# Patient Record
Sex: MALE | Race: WHITE | NOT HISPANIC OR LATINO | Employment: FULL TIME | URBAN - METROPOLITAN AREA
[De-identification: names, ages, dates, MRNs, and addresses within clinical notes are randomized per-mention and may not be internally consistent; named-entity substitution may affect disease eponyms.]

---

## 2017-01-19 ENCOUNTER — OFFICE VISIT (OUTPATIENT)
Dept: PODIATRY | Facility: CLINIC | Age: 67
End: 2017-01-19
Payer: COMMERCIAL

## 2017-01-19 VITALS
SYSTOLIC BLOOD PRESSURE: 140 MMHG | WEIGHT: 281.31 LBS | BODY MASS INDEX: 38.15 KG/M2 | HEART RATE: 61 BPM | DIASTOLIC BLOOD PRESSURE: 76 MMHG

## 2017-01-19 DIAGNOSIS — B35.3 TINEA PEDIS OF BOTH FEET: Primary | ICD-10-CM

## 2017-01-19 DIAGNOSIS — B35.1 ONYCHOMYCOSIS DUE TO DERMATOPHYTE: ICD-10-CM

## 2017-01-19 PROCEDURE — 3078F DIAST BP <80 MM HG: CPT | Mod: S$GLB,,, | Performed by: PODIATRIST

## 2017-01-19 PROCEDURE — 99999 PR PBB SHADOW E&M-EST. PATIENT-LVL III: CPT | Mod: PBBFAC,,, | Performed by: PODIATRIST

## 2017-01-19 PROCEDURE — 3077F SYST BP >= 140 MM HG: CPT | Mod: S$GLB,,, | Performed by: PODIATRIST

## 2017-01-19 PROCEDURE — 1159F MED LIST DOCD IN RCRD: CPT | Mod: S$GLB,,, | Performed by: PODIATRIST

## 2017-01-19 PROCEDURE — 99202 OFFICE O/P NEW SF 15 MIN: CPT | Mod: 25,S$GLB,, | Performed by: PODIATRIST

## 2017-01-19 RX ORDER — NAFTIFINE HYDROCHLORIDE 20 MG/G
1 GEL TOPICAL DAILY
Qty: 45 G | Refills: 3 | Status: SHIPPED | OUTPATIENT
Start: 2017-01-19

## 2017-01-19 RX ORDER — CITALOPRAM 20 MG/1
20 TABLET, FILM COATED ORAL DAILY
COMMUNITY

## 2017-01-19 NOTE — PATIENT INSTRUCTIONS
ATHLETES FOOT (Tinea Pedis) or FUNGAL (Mycotic) NAILS    Athlete's foot is a fungal infection that develops in the moist areas between your toes and sometimes on other parts of your foot. Athlete's foot usually causes itching, stinging, burning and may also present with scaling and maceration.  When the feet or other areas of the body stay moist, warm, and irritated, this fungus can thrive and infect the upper layer of the skin.  The fungus sometimes also infects the nails as well and usually presents as a discolored, thickened or distorted toenail which the patient has noticed for a period of weeks, months, or years, and may involve one or all of the nails. Because the fungus lives under the nail, it is very difficult to eradicate germ completely.  Fungus toenails are very recalcitrant to treatment and, at best, the patients can hope to control the problem from spreading or diminish the effect of the germ on the nail. Because the germ is present in the environment and something patients are exposed to every day, the chances of completely eradication the fungus nail problem are not great.    Both oral and topical treatments of the fungus are available.  The oral treatments may have negative effects on the liver, so blood work is needed to begin oral therapy.  Even once the fungus has been eradicated, maintenance is required to prevent re-infection.  Topical treatment is also available, but requires persistence and diligence to see effective results.      My recommendations for severe athletes foot are as follows:    - Twice daily application of topical antifungal cream or gel after thorough cleansing and drying.  - If odor and excessive sweating of the feet are a problem an anti-perspirant spray should be applied to the bottoms of the feet followed by an antifungal powder or lambswool between the toes every day in the morning.  - Also, shoes should be sprayed with a disinfectant after use and allowed to dry for at  least 24 hours, therefore shoes should be alternated and not worn on consecutive days.    - In addition, if fungal nails are present, a topical nail lacquer may be applied twice daily with filing of the nail prior to each new coat of medication for better penetration.    Not all medications are effective on every type of fungus, so if problems persist you may need alterations or adjustments to your medication or maintenance regiment.               LINDA [email or web address].[email or web address].        Apply nail medication daily as directed with filing of nail prior to application.

## 2017-01-19 NOTE — MR AVS SNAPSHOT
[facility]  [address] [facility] [address]  Phone: [phone]  Fax: [phone]                  Johan Sky   2017 3:00 PM   Office Visit    Description:  Male : 1950   Provider:  Pilar Montenegro DPM   Department:  [facility]           Reason for Visit     Nail Problem           Diagnoses this Visit        Comments    Tinea pedis of both feet    -  Primary     Onychomycosis due to dermatophyte                To Do List           Future Appointments        Provider Department Dept Phone    2017 2:20 PM [phone]      Goals (5 Years of Data)     None      Follow-Up and Disposition     Return in about 3 months (around 2017) for tinea and mycotic nails.       These Medications        Disp Refills Start End    naftifine 2 % Gel 45 g 3 2017     Apply 1 application topically once daily. - Topical (Top)    Pharmacy: [address] Ph #: [phone]       efinaconazole (JUBLIA) 10 % Lakesha 8 mL 11 2017     Apply 1 application topically once daily. - Topical (Top)    Pharmacy: [address], [address] Ph #: [phone]         [facility]     [facility] On Call Nurse Care Line -  Assistance  Registered nurses in the [facility] provide clinical advisement, health education, appointment booking, and other advisory services.  Call for this free service at [phone].             Medications           Message regarding Medications     Verify the changes and/or additions to your medication regime listed below are the same as discussed with your clinician today.  If any of these changes or additions are incorrect, please notify your healthcare provider.        START taking these NEW medications        Refills    naftifine 2 % Gel 3    Sig: Apply 1 application  topically once daily.    Class: Normal    Route: Topical (Top)    efinaconazole (JUBLIA) 10 % Lakesha 11    Sig: Apply 1 application topically once daily.    Class: Normal    Route: Topical (Top)      STOP taking these medications     ciclopirox (PENLAC) 8 % solution Apply 1 applicator topically. As directed- apply to affected nails 1-2 times daily as directed.           Verify that the below list of medications is an accurate representation of the medications you are currently taking.  If none reported, the list may be blank. If incorrect, please contact your healthcare provider. Carry this list with you in case of emergency.           Current Medications     aspirin 81 MG chewable tablet Take 1 tablet by mouth Daily.    citalopram (CELEXA) 20 MG tablet Take 20 mg by mouth once daily.    losartan-hydrochlorothiazide 100-25 mg (HYZAAR) 100-25 mg per tablet     tamsulosin (FLOMAX) 0.4 mg Cp24     zolpidem (AMBIEN) 5 MG Tab     efinaconazole (JUBLIA) 10 % Lakesha Apply 1 application topically once daily.    naftifine 2 % Gel Apply 1 application topically once daily.    UMECTA NAIL FILM PEN 40 % NFSP APPLY TO AFFECTED NAILS ONCE TO TWICE DAILY WITH LIGHT FILING BETWEEN APPLICATIONS           Clinical Reference Information           Vital Signs - Last Recorded  Most recent update: 1/19/2017  3:06 PM by Genesis Guthrie LPN    BP Pulse Wt BMI       (!) 140/76 61 127.6 kg (281 lb 4.9 oz) 38.15 kg/m2       Blood Pressure          Most Recent Value    BP  (!)  140/76      Allergies as of 1/19/2017     No Known Drug Allergies      Immunizations Administered on Date of Encounter - 1/19/2017     None      [facility] Sign-Up     Activating your [facility] account is as easy as 1-2-3!     1) Visit [email or web address], select Sign Up Now, enter this activation code and your date of birth, then select Next.  [email or web address]  Expires: 3/5/2017  3:21 PM      2) Create a username and password to use when you visit [facility] in the future and  select a security question in case you lose your password and select Next.    3) Enter your e-mail address and click Sign Up!    Additional Information  If you have questions, please e-mail [email or web address] or call [phone] to talk to our [facility] staff. Remember, [facility] is NOT to be used for urgent needs. For medical emergencies, dial 911.         Instructions    ATHLETES FOOT (Tinea Pedis) or FUNGAL (Mycotic) NAILS    Athlete's foot is a fungal infection that develops in the moist areas between your toes and sometimes on other parts of your foot. Athlete's foot usually causes itching, stinging, burning and may also present with scaling and maceration.  When the feet or other areas of the body stay moist, warm, and irritated, this fungus can thrive and infect the upper layer of the skin.  The fungus sometimes also infects the nails as well and usually presents as a discolored, thickened or distorted toenail which the patient has noticed for a period of weeks, months, or years, and may involve one or all of the nails. Because the fungus lives under the nail, it is very difficult to eradicate germ completely.  Fungus toenails are very recalcitrant to treatment and, at best, the patients can hope to control the problem from spreading or diminish the effect of the germ on the nail. Because the germ is present in the environment and something patients are exposed to every day, the chances of completely eradication the fungus nail problem are not great.    Both oral and topical treatments of the fungus are available.  The oral treatments may have negative effects on the liver, so blood work is needed to begin oral therapy.  Even once the fungus has been eradicated, maintenance is required to prevent re-infection.  Topical treatment is also available, but requires persistence and diligence to see effective results.      My recommendations for severe athletes foot are as follows:    - Twice daily application  of topical antifungal cream or gel after thorough cleansing and drying.  - If odor and excessive sweating of the feet are a problem an anti-perspirant spray should be applied to the bottoms of the feet followed by an antifungal powder or lambswool between the toes every day in the morning.  - Also, shoes should be sprayed with a disinfectant after use and allowed to dry for at least 24 hours, therefore shoes should be alternated and not worn on consecutive days.    - In addition, if fungal nails are present, a topical nail lacquer may be applied twice daily with filing of the nail prior to each new coat of medication for better penetration.    Not all medications are effective on every type of fungus, so if problems persist you may need alterations or adjustments to your medication or maintenance regiment.               COLE Hernandez.P.M.        Apply nail medication daily as directed with filing of nail prior to application.

## 2017-01-19 NOTE — PROGRESS NOTES
Subjective:      Patient ID: Johan Sky is a 66 y.o. male.    Chief Complaint: Nail Problem (Patient states that he thinks he has a nail fungus and a rash under the skin. Makes small blisters that are painful when they are popped and the skin beneath is exposed. )    Johan Sky is a 66 y.o. year-old male presenting to podiatry clinic with complaint of athlete's foot and fungal nails. The patient complains of itching, burning, scaling and sweating that has been on going for the past 2 years with red spots. In addition, the patient has also been noticing progressive thickening and discoloration of the nails. The patient has tried over the counter medications with some success, but the problem is recurrent and aggravating.        Review of Systems   Constitution: Negative for chills and fever.   Cardiovascular: Negative for chest pain.   Respiratory: Negative for shortness of breath.    Gastrointestinal: Negative for nausea and vomiting.           Objective:      Physical Exam   Constitutional: He is oriented to person, place, and time. He appears well-developed and well-nourished. No distress.   Cardiovascular:   Pulses:       Dorsalis pedis pulses are 2+ on the right side, and 2+ on the left side.        Posterior tibial pulses are 2+ on the right side, and 2+ on the left side.   Capillary fill time 3 seconds to digits bilateral feet.   Musculoskeletal:   Lower extremities:    Deformities: None    Normal arch height and rectus feet bilaterally.     5/5 muscle strength and tone in all four quadrants bilaterally.     Pain-free range of motion in all four quadrants WNL bilaterally.     Pain on palpation: None     Neurological: He is alert and oriented to person, place, and time. He displays no Babinski's sign on the right side. He displays no Babinski's sign on the left side.   Plantar protective threshold sensation by touch via 5.07 SWMF present bilaterally.    Skin:   Lower extremities:    Normal turgor, texture,  temperature bilaterally. Digital hair present bilaterally. Warm equally bilaterally.     No bilateral edema, varicosities, pigmentary changes.    No wounds, drainage were noted.    Malodor: None  Erythema: None  Interdigital maceration: webspaces 4th bilaterally.     Red dry vesicles and erythema in moccasin distribution bilaterally worse to medial instep and bilateral hallux perinail and distally. Xerosis bilaterally.    Nails are thick dystrophic and discolored bilaterally 1st and 2nd digits.     Psychiatric: He has a normal mood and affect.   Nursing note and vitals reviewed.            Assessment:       Encounter Diagnoses   Name Primary?    Tinea pedis of both feet Yes    Onychomycosis due to dermatophyte          Plan:       Johan was seen today for nail problem.    Diagnoses and all orders for this visit:    Tinea pedis of both feet    Onychomycosis due to dermatophyte    Other orders  -     naftifine 2 % Gel; Apply 1 application topically once daily.  -     efinaconazole (JUBLIA) 10 % Lakesha; Apply 1 application topically once daily.      I counseled the patient on his conditions, their implications and medical management.      Patient was given written instructions on maintenance care for athlete's foot and mycotic nails. The need for proper skin care in order to prevent infection and colonization in the nails was explained to the patient. Discuss treatment options for nail fungus.  I explained that fungus lives in a warm dark moist environment and therefore patient should make every attempt to keep feet clean and dry.  We discussed drying feet thoroughly after shower particularly between the toes and then applying powder between the toes and in the shoes.  For the nails, patient was prescribed  Jublia to be applied daily with filing in between applications. We discussed oral Lamisil but I did not recommend it as a first line of treatment since it is an internal medicine that may potentially have side  effects, including liver problems. Patient elects for topical treatment.     For the athletes foot, patient was prescribed Naftin gel to apply between the toes and to the bottoms of feet daily. In addition, recommendations were made to spray and disinfect shoes with Lysol and to alternate shoes. Also, for excessive sweating patient was instructed to use Arid Extra Dry spray on the bottom of the feet with Tinactin powder in the digital interspaces.     .